# Patient Record
Sex: FEMALE | Race: WHITE | NOT HISPANIC OR LATINO | URBAN - METROPOLITAN AREA
[De-identification: names, ages, dates, MRNs, and addresses within clinical notes are randomized per-mention and may not be internally consistent; named-entity substitution may affect disease eponyms.]

---

## 2017-09-01 ENCOUNTER — INPATIENT (INPATIENT)
Facility: HOSPITAL | Age: 72
LOS: 0 days | Discharge: HOME | DRG: 379 | End: 2017-09-02
Attending: INTERNAL MEDICINE | Admitting: INTERNAL MEDICINE
Payer: COMMERCIAL

## 2017-09-01 VITALS
HEART RATE: 82 BPM | DIASTOLIC BLOOD PRESSURE: 84 MMHG | OXYGEN SATURATION: 98 % | TEMPERATURE: 97 F | WEIGHT: 160.06 LBS | HEIGHT: 64 IN | RESPIRATION RATE: 15 BRPM | SYSTOLIC BLOOD PRESSURE: 157 MMHG

## 2017-09-01 DIAGNOSIS — K92.1 MELENA: ICD-10-CM

## 2017-09-01 DIAGNOSIS — K92.2 GASTROINTESTINAL HEMORRHAGE, UNSPECIFIED: ICD-10-CM

## 2017-09-01 DIAGNOSIS — E78.00 PURE HYPERCHOLESTEROLEMIA, UNSPECIFIED: ICD-10-CM

## 2017-09-01 LAB
ALBUMIN SERPL ELPH-MCNC: 3.6 G/DL — SIGNIFICANT CHANGE UP (ref 3.3–5)
ALP SERPL-CCNC: 42 U/L — SIGNIFICANT CHANGE UP (ref 40–120)
ALT FLD-CCNC: 21 U/L — SIGNIFICANT CHANGE UP (ref 12–78)
ANION GAP SERPL CALC-SCNC: 11 MMOL/L — SIGNIFICANT CHANGE UP (ref 5–17)
APTT BLD: 30 SEC — SIGNIFICANT CHANGE UP (ref 27.5–37.4)
AST SERPL-CCNC: 17 U/L — SIGNIFICANT CHANGE UP (ref 15–37)
BILIRUB SERPL-MCNC: 0.3 MG/DL — SIGNIFICANT CHANGE UP (ref 0.2–1.2)
BLD GP AB SCN SERPL QL: SIGNIFICANT CHANGE UP
BUN SERPL-MCNC: 16 MG/DL — SIGNIFICANT CHANGE UP (ref 7–23)
CALCIUM SERPL-MCNC: 8.7 MG/DL — SIGNIFICANT CHANGE UP (ref 8.5–10.1)
CHLORIDE SERPL-SCNC: 108 MMOL/L — SIGNIFICANT CHANGE UP (ref 96–108)
CO2 SERPL-SCNC: 24 MMOL/L — SIGNIFICANT CHANGE UP (ref 22–31)
CREAT SERPL-MCNC: 0.9 MG/DL — SIGNIFICANT CHANGE UP (ref 0.5–1.3)
GLUCOSE SERPL-MCNC: 120 MG/DL — HIGH (ref 70–99)
HCT VFR BLD CALC: 25.3 % — LOW (ref 34.5–45)
HCT VFR BLD CALC: 26.3 % — LOW (ref 34.5–45)
HCT VFR BLD CALC: 34.8 % — SIGNIFICANT CHANGE UP (ref 34.5–45)
HGB BLD-MCNC: 11.6 G/DL — SIGNIFICANT CHANGE UP (ref 11.5–15.5)
HGB BLD-MCNC: 9 G/DL — LOW (ref 11.5–15.5)
HGB BLD-MCNC: 9.1 G/DL — LOW (ref 11.5–15.5)
INR BLD: 1.08 RATIO — SIGNIFICANT CHANGE UP (ref 0.88–1.16)
LYMPHOCYTES # BLD AUTO: 44 % — SIGNIFICANT CHANGE UP (ref 13–44)
MCHC RBC-ENTMCNC: 30.4 PG — SIGNIFICANT CHANGE UP (ref 27–34)
MCHC RBC-ENTMCNC: 31.2 PG — SIGNIFICANT CHANGE UP (ref 27–34)
MCHC RBC-ENTMCNC: 31.6 PG — SIGNIFICANT CHANGE UP (ref 27–34)
MCHC RBC-ENTMCNC: 33.4 GM/DL — SIGNIFICANT CHANGE UP (ref 32–36)
MCHC RBC-ENTMCNC: 34.6 GM/DL — SIGNIFICANT CHANGE UP (ref 32–36)
MCHC RBC-ENTMCNC: 35.4 GM/DL — SIGNIFICANT CHANGE UP (ref 32–36)
MCV RBC AUTO: 89.1 FL — SIGNIFICANT CHANGE UP (ref 80–100)
MCV RBC AUTO: 90 FL — SIGNIFICANT CHANGE UP (ref 80–100)
MCV RBC AUTO: 90.8 FL — SIGNIFICANT CHANGE UP (ref 80–100)
MONOCYTES NFR BLD AUTO: 8 % — SIGNIFICANT CHANGE UP (ref 1–9)
NEUTROPHILS NFR BLD AUTO: 47 % — SIGNIFICANT CHANGE UP (ref 43–77)
NEUTS BAND # BLD: 1 % — SIGNIFICANT CHANGE UP (ref 0–8)
OB PNL STL: POSITIVE
PLAT MORPH BLD: NORMAL — SIGNIFICANT CHANGE UP
PLATELET # BLD AUTO: 145 K/UL — LOW (ref 150–400)
PLATELET # BLD AUTO: 151 K/UL — SIGNIFICANT CHANGE UP (ref 150–400)
PLATELET # BLD AUTO: 210 K/UL — SIGNIFICANT CHANGE UP (ref 150–400)
POTASSIUM SERPL-MCNC: 3.9 MMOL/L — SIGNIFICANT CHANGE UP (ref 3.5–5.3)
POTASSIUM SERPL-SCNC: 3.9 MMOL/L — SIGNIFICANT CHANGE UP (ref 3.5–5.3)
PROT SERPL-MCNC: 7.5 G/DL — SIGNIFICANT CHANGE UP (ref 6–8.3)
PROTHROM AB SERPL-ACNC: 11.8 SEC — SIGNIFICANT CHANGE UP (ref 9.8–12.7)
RBC # BLD: 2.85 M/UL — LOW (ref 3.8–5.2)
RBC # BLD: 2.92 M/UL — LOW (ref 3.8–5.2)
RBC # BLD: 3.84 M/UL — SIGNIFICANT CHANGE UP (ref 3.8–5.2)
RBC # FLD: 12.1 % — SIGNIFICANT CHANGE UP (ref 10.3–14.5)
RBC # FLD: 12.2 % — SIGNIFICANT CHANGE UP (ref 10.3–14.5)
RBC # FLD: 12.3 % — SIGNIFICANT CHANGE UP (ref 10.3–14.5)
RBC BLD AUTO: SIGNIFICANT CHANGE UP
SODIUM SERPL-SCNC: 143 MMOL/L — SIGNIFICANT CHANGE UP (ref 135–145)
WBC # BLD: 11.8 K/UL — HIGH (ref 3.8–10.5)
WBC # BLD: 7.9 K/UL — SIGNIFICANT CHANGE UP (ref 3.8–10.5)
WBC # BLD: 9.3 K/UL — SIGNIFICANT CHANGE UP (ref 3.8–10.5)
WBC # FLD AUTO: 11.8 K/UL — HIGH (ref 3.8–10.5)
WBC # FLD AUTO: 7.9 K/UL — SIGNIFICANT CHANGE UP (ref 3.8–10.5)
WBC # FLD AUTO: 9.3 K/UL — SIGNIFICANT CHANGE UP (ref 3.8–10.5)

## 2017-09-01 PROCEDURE — 88312 SPECIAL STAINS GROUP 1: CPT | Mod: 26

## 2017-09-01 PROCEDURE — 93010 ELECTROCARDIOGRAM REPORT: CPT

## 2017-09-01 PROCEDURE — 99285 EMERGENCY DEPT VISIT HI MDM: CPT

## 2017-09-01 PROCEDURE — 88305 TISSUE EXAM BY PATHOLOGIST: CPT | Mod: 26

## 2017-09-01 PROCEDURE — 71010: CPT | Mod: 26

## 2017-09-01 RX ORDER — OMEGA-3 ACID ETHYL ESTERS 1 G
0 CAPSULE ORAL
Qty: 0 | Refills: 0 | COMMUNITY

## 2017-09-01 RX ORDER — PANTOPRAZOLE SODIUM 20 MG/1
8 TABLET, DELAYED RELEASE ORAL
Qty: 80 | Refills: 0 | Status: DISCONTINUED | OUTPATIENT
Start: 2017-09-01 | End: 2017-09-01

## 2017-09-01 RX ORDER — PANTOPRAZOLE SODIUM 20 MG/1
40 TABLET, DELAYED RELEASE ORAL ONCE
Qty: 0 | Refills: 0 | Status: COMPLETED | OUTPATIENT
Start: 2017-09-01 | End: 2017-09-01

## 2017-09-01 RX ORDER — SUCRALFATE 1 G
1 TABLET ORAL EVERY 6 HOURS
Qty: 0 | Refills: 0 | Status: DISCONTINUED | OUTPATIENT
Start: 2017-09-01 | End: 2017-09-02

## 2017-09-01 RX ORDER — SODIUM CHLORIDE 9 MG/ML
1000 INJECTION INTRAMUSCULAR; INTRAVENOUS; SUBCUTANEOUS
Qty: 0 | Refills: 0 | Status: DISCONTINUED | OUTPATIENT
Start: 2017-09-01 | End: 2017-09-01

## 2017-09-01 RX ORDER — ONDANSETRON 8 MG/1
4 TABLET, FILM COATED ORAL ONCE
Qty: 0 | Refills: 0 | Status: DISCONTINUED | OUTPATIENT
Start: 2017-09-01 | End: 2017-09-01

## 2017-09-01 RX ORDER — ROSUVASTATIN CALCIUM 5 MG/1
0 TABLET ORAL
Qty: 0 | Refills: 0 | COMMUNITY

## 2017-09-01 RX ORDER — PANTOPRAZOLE SODIUM 20 MG/1
8 TABLET, DELAYED RELEASE ORAL
Qty: 80 | Refills: 0 | Status: DISCONTINUED | OUTPATIENT
Start: 2017-09-01 | End: 2017-09-02

## 2017-09-01 RX ORDER — SODIUM CHLORIDE 9 MG/ML
1000 INJECTION INTRAMUSCULAR; INTRAVENOUS; SUBCUTANEOUS ONCE
Qty: 0 | Refills: 0 | Status: COMPLETED | OUTPATIENT
Start: 2017-09-01 | End: 2017-09-01

## 2017-09-01 RX ORDER — METOCLOPRAMIDE HCL 10 MG
10 TABLET ORAL ONCE
Qty: 0 | Refills: 0 | Status: COMPLETED | OUTPATIENT
Start: 2017-09-01 | End: 2017-09-01

## 2017-09-01 RX ORDER — SODIUM CHLORIDE 9 MG/ML
1000 INJECTION INTRAMUSCULAR; INTRAVENOUS; SUBCUTANEOUS
Qty: 0 | Refills: 0 | Status: DISCONTINUED | OUTPATIENT
Start: 2017-09-01 | End: 2017-09-02

## 2017-09-01 RX ADMIN — SODIUM CHLORIDE 100 MILLILITER(S): 9 INJECTION INTRAMUSCULAR; INTRAVENOUS; SUBCUTANEOUS at 18:13

## 2017-09-01 RX ADMIN — PANTOPRAZOLE SODIUM 10 MG/HR: 20 TABLET, DELAYED RELEASE ORAL at 12:31

## 2017-09-01 RX ADMIN — SODIUM CHLORIDE 80 MILLILITER(S): 9 INJECTION INTRAMUSCULAR; INTRAVENOUS; SUBCUTANEOUS at 13:15

## 2017-09-01 RX ADMIN — SODIUM CHLORIDE 1000 MILLILITER(S): 9 INJECTION INTRAMUSCULAR; INTRAVENOUS; SUBCUTANEOUS at 11:37

## 2017-09-01 RX ADMIN — Medication 10 MILLIGRAM(S): at 13:15

## 2017-09-01 RX ADMIN — PANTOPRAZOLE SODIUM 40 MILLIGRAM(S): 20 TABLET, DELAYED RELEASE ORAL at 11:37

## 2017-09-01 RX ADMIN — PANTOPRAZOLE SODIUM 10 MG/HR: 20 TABLET, DELAYED RELEASE ORAL at 13:35

## 2017-09-01 NOTE — ED ADULT NURSE NOTE - OBJECTIVE STATEMENT
received pt in bed #12b Pt A&O c/o black stools since Wednesday went to urgent care yesterday & was told there is blood in her stool. Pt seen by Dr Billings Pt resting comf denies abdominal pain Will monitor

## 2017-09-01 NOTE — ED PROVIDER NOTE - OBJECTIVE STATEMENT
pt c/o black stools x 3 days. pt seen yest at urgent care, had positive occult blood. no fevers, chills, ha, d/n/v, cp, sob, abd pain.  cheyanne - maryam naderson

## 2017-09-01 NOTE — CONSULT NOTE ADULT - SUBJECTIVE AND OBJECTIVE BOX
Chief Complaint:  Patient is a 71y old  Female who presents with a chief complaint of Melena     HPI: This is a 71 year old female with a past medical history significant for GERD, FIDENCIO, HLD who presents complaining of black tarry stools which started 2 days ago. Yesterday she went to an urgent care center and was told she had blood in her stool. Pt admits to nausea. The patient denies vomiting, BRBPR, abdominal pain, dysphasia. Pt last had a Colonoscopy in 2013 which was reportedly normal.    Allergies:  sulfa drugs (Nausea)      Medications:      PMHX/PSHX:  High cholesterol      Family history:  Non- contributory     Social History: admits to around 8 drinks/weekly, denies tobacco use    ROS:     General:  No wt loss, fevers, chills, night sweats, fatigue,   Eyes:  Good vision, no reported pain  ENT:  No sore throat, pain, runny nose, dysphagia  CV:  No pain, palpitations, hypo/hypertension  Resp:  No dyspnea, cough, tachypnea, wheezing  GI:  +melena   :  No pain, bleeding, incontinence, nocturia  Muscle:  No pain, weakness  Neuro:  No weakness, tingling, memory problems  Psych:  No fatigue, insomnia, mood problems, depression  Endocrine:  No polyuria, polydipsia, cold/heat intolerance  Heme:  No petechiae, ecchymosis, easy bruisability  Skin:  No rash, tattoos, scars, edema      PHYSICAL EXAM:   Vital Signs:  Vital Signs Last 24 Hrs  T(C): 36.3 (01 Sep 2017 10:57), Max: 36.3 (01 Sep 2017 10:57)  T(F): 97.4 (01 Sep 2017 10:57), Max: 97.4 (01 Sep 2017 10:57)  HR: 82 (01 Sep 2017 10:57) (82 - 82)  BP: 157/84 (01 Sep 2017 10:57) (157/84 - 157/84)  BP(mean): --  RR: 15 (01 Sep 2017 10:57) (15 - 15)  SpO2: 98% (01 Sep 2017 10:57) (98% - 98%)  Daily Height in cm: 162.56 (01 Sep 2017 10:57)    Daily     GENERAL:  Appears stated age, well-groomed, well-nourished, no distress  HEENT:  NC/AT,  conjunctivae clear and pink, no thyromegaly, nodules, adenopathy, no JVD, sclera -anicteric  CHEST:  Full & symmetric excursion, no increased effort, breath sounds clear  HEART:  Regular rhythm, S1, S2, no murmur/rub/S3/S4, no abdominal bruit, no edema  ABDOMEN:  Soft, non-tender, non-distended, normoactive bowel sounds,  no masses ,no hepato-splenomegaly, no signs of chronic liver disease  EXTREMITIES  no cyanosis,clubbing or edema  SKIN:  No rash/erythema/ecchymoses/petechiae/wounds/abscess/warm/dry  NEURO:  Alert, oriented, no asterixis, no tremor, no encephalopathy    LABS:                        11.6   11.8  )-----------( 210      ( 01 Sep 2017 11:31 )             34.8             PT/INR - ( 01 Sep 2017 11:31 )   PT: 11.8 sec;   INR: 1.08 ratio         PTT - ( 01 Sep 2017 11:31 )  PTT:30.0 sec        Imaging:

## 2017-09-01 NOTE — H&P ADULT - HISTORY OF PRESENT ILLNESS
This is a 71 F with PMH of HLD who came in c/o black stool. Patient reports feeling some reflux/acidity on 8/29/17 for which she took a zantac. On 8/30/17 she had one episode of black tarry stool. She had another episode on 8/31/17. She went to an urgent care center where she tested positive for OB. She reports feeling some lightheadedness today. Denies CP, SOB, EMERSON, n/v/d, palpitations, fevers, chills, abdominal pain. She had another dark BM this AM. She denies use of ibuprofen, motrin, aleve, or any other NSAIDs.

## 2017-09-01 NOTE — H&P ADULT - NEGATIVE GASTROINTESTINAL SYMPTOMS
no nausea/no vomiting/no flatulence/no change in bowel habits/no hematochezia/no steatorrhea/no constipation/no abdominal pain/no hiccoughs/no jaundice/no diarrhea

## 2017-09-01 NOTE — CONSULT NOTE ADULT - PROBLEM SELECTOR RECOMMENDATION 9
Pt with melena and FOBT+ stool  Likely upper gastrointestinal source of bleeding  keep pt NPO, Start IV Protonix gtt  Monitor H/H Q 4-6 hours, transfuse prn   Plan for upper endoscopy to further evaluate

## 2017-09-01 NOTE — H&P ADULT - PROBLEM SELECTOR PLAN 1
admit  NPO  IVF  IV PPI gtt  serial CBC  transfuse prn  MAY PROCEED FOR EGD TODAY  GI consult  further treatment pending clinical course

## 2017-09-02 VITALS
SYSTOLIC BLOOD PRESSURE: 130 MMHG | TEMPERATURE: 98 F | OXYGEN SATURATION: 100 % | DIASTOLIC BLOOD PRESSURE: 74 MMHG | HEART RATE: 74 BPM | RESPIRATION RATE: 17 BRPM

## 2017-09-02 DIAGNOSIS — K27.4 CHRONIC OR UNSPECIFIED PEPTIC ULCER, SITE UNSPECIFIED, WITH HEMORRHAGE: ICD-10-CM

## 2017-09-02 LAB
ANION GAP SERPL CALC-SCNC: 5 MMOL/L — SIGNIFICANT CHANGE UP (ref 5–17)
BUN SERPL-MCNC: 9 MG/DL — SIGNIFICANT CHANGE UP (ref 7–23)
CALCIUM SERPL-MCNC: 7.9 MG/DL — LOW (ref 8.5–10.1)
CHLORIDE SERPL-SCNC: 114 MMOL/L — HIGH (ref 96–108)
CO2 SERPL-SCNC: 27 MMOL/L — SIGNIFICANT CHANGE UP (ref 22–31)
CREAT SERPL-MCNC: 0.7 MG/DL — SIGNIFICANT CHANGE UP (ref 0.5–1.3)
GLUCOSE SERPL-MCNC: 92 MG/DL — SIGNIFICANT CHANGE UP (ref 70–99)
HCT VFR BLD CALC: 26.8 % — LOW (ref 34.5–45)
HGB BLD-MCNC: 9 G/DL — LOW (ref 11.5–15.5)
MAGNESIUM SERPL-MCNC: 2 MG/DL — SIGNIFICANT CHANGE UP (ref 1.6–2.6)
MCHC RBC-ENTMCNC: 30.4 PG — SIGNIFICANT CHANGE UP (ref 27–34)
MCHC RBC-ENTMCNC: 33.5 GM/DL — SIGNIFICANT CHANGE UP (ref 32–36)
MCV RBC AUTO: 90.8 FL — SIGNIFICANT CHANGE UP (ref 80–100)
PLATELET # BLD AUTO: 143 K/UL — LOW (ref 150–400)
POTASSIUM SERPL-MCNC: 3.8 MMOL/L — SIGNIFICANT CHANGE UP (ref 3.5–5.3)
POTASSIUM SERPL-SCNC: 3.8 MMOL/L — SIGNIFICANT CHANGE UP (ref 3.5–5.3)
RBC # BLD: 2.95 M/UL — LOW (ref 3.8–5.2)
RBC # FLD: 12.2 % — SIGNIFICANT CHANGE UP (ref 10.3–14.5)
SODIUM SERPL-SCNC: 146 MMOL/L — HIGH (ref 135–145)
WBC # BLD: 7.9 K/UL — SIGNIFICANT CHANGE UP (ref 3.8–10.5)
WBC # FLD AUTO: 7.9 K/UL — SIGNIFICANT CHANGE UP (ref 3.8–10.5)

## 2017-09-02 PROCEDURE — 96374 THER/PROPH/DIAG INJ IV PUSH: CPT

## 2017-09-02 PROCEDURE — 85610 PROTHROMBIN TIME: CPT

## 2017-09-02 PROCEDURE — 99285 EMERGENCY DEPT VISIT HI MDM: CPT | Mod: 25

## 2017-09-02 PROCEDURE — 88312 SPECIAL STAINS GROUP 1: CPT

## 2017-09-02 PROCEDURE — 86850 RBC ANTIBODY SCREEN: CPT

## 2017-09-02 PROCEDURE — 83735 ASSAY OF MAGNESIUM: CPT

## 2017-09-02 PROCEDURE — 86900 BLOOD TYPING SEROLOGIC ABO: CPT

## 2017-09-02 PROCEDURE — 96375 TX/PRO/DX INJ NEW DRUG ADDON: CPT

## 2017-09-02 PROCEDURE — 80053 COMPREHEN METABOLIC PANEL: CPT

## 2017-09-02 PROCEDURE — 80048 BASIC METABOLIC PNL TOTAL CA: CPT

## 2017-09-02 PROCEDURE — 71045 X-RAY EXAM CHEST 1 VIEW: CPT

## 2017-09-02 PROCEDURE — 85027 COMPLETE CBC AUTOMATED: CPT

## 2017-09-02 PROCEDURE — 96376 TX/PRO/DX INJ SAME DRUG ADON: CPT

## 2017-09-02 PROCEDURE — 86901 BLOOD TYPING SEROLOGIC RH(D): CPT

## 2017-09-02 PROCEDURE — 88305 TISSUE EXAM BY PATHOLOGIST: CPT

## 2017-09-02 PROCEDURE — 93005 ELECTROCARDIOGRAM TRACING: CPT

## 2017-09-02 PROCEDURE — 85730 THROMBOPLASTIN TIME PARTIAL: CPT

## 2017-09-02 PROCEDURE — 82272 OCCULT BLD FECES 1-3 TESTS: CPT

## 2017-09-02 RX ORDER — SUCRALFATE 1 G
1 TABLET ORAL
Qty: 90 | Refills: 0 | OUTPATIENT
Start: 2017-09-02 | End: 2017-10-02

## 2017-09-02 RX ORDER — ASPIRIN/CALCIUM CARB/MAGNESIUM 324 MG
0 TABLET ORAL
Qty: 0 | Refills: 0 | COMMUNITY

## 2017-09-02 RX ORDER — PANTOPRAZOLE SODIUM 20 MG/1
1 TABLET, DELAYED RELEASE ORAL
Qty: 60 | Refills: 1 | OUTPATIENT
Start: 2017-09-02 | End: 2017-10-31

## 2017-09-02 RX ADMIN — Medication 1 GRAM(S): at 15:32

## 2017-09-02 RX ADMIN — Medication 1 GRAM(S): at 09:29

## 2017-09-02 RX ADMIN — PANTOPRAZOLE SODIUM 10 MG/HR: 20 TABLET, DELAYED RELEASE ORAL at 05:28

## 2017-09-02 RX ADMIN — Medication 1 GRAM(S): at 03:53

## 2017-09-02 NOTE — PROGRESS NOTE ADULT - PROBLEM SELECTOR PLAN 1
h/h stable  continue ppi, carafate  advance diet  GI f/u  D/C home when ok with GI -- today or tomorrow
no further melena, s/p EGD w/ PUD and thin based ulcer.  No GI objection to d/c w/ protonix and carafate.

## 2017-09-02 NOTE — DISCHARGE NOTE ADULT - PATIENT PORTAL LINK FT
“You can access the [facility] Patient Portal, offered by [facility], by registering with the following website: [email or web address]”

## 2017-09-02 NOTE — DISCHARGE NOTE ADULT - PLAN OF CARE
. Continue current medications  No aspirin, advil, aleve, motrin, etc.  Tylenol ok for pain  Follow-up with primary doctor next week.  Follow-up with GI in 4-6 weeks.

## 2017-09-02 NOTE — DISCHARGE NOTE ADULT - CARE PROVIDER_API CALL
[place],   Phone: (   )    -  Fax: (   )    -    Josh Lewis (MD), Gastroenterology  [address] [place] [id number]  Phone: [phone]  Fax: [phone]

## 2017-09-02 NOTE — DISCHARGE NOTE ADULT - CARE PROVIDERS DIRECT ADDRESSES
,DirectAddress_Unknown,[email or web address]

## 2017-09-02 NOTE — DISCHARGE NOTE ADULT - HOSPITAL COURSE
This is a 71 F with PMH of HLD who came in c/o black stool. Patient reports feeling some reflux/acidity on 8/29/17 for which she took a zantac. On 8/30/17 she had one episode of black tarry stool. She had another episode on 8/31/17. She went to an urgent care center where she tested positive for OB. She reports feeling some lightheadedness today. Denies CP, SOB, EMERSON, n/v/d, palpitations, fevers, chills, abdominal pain. She had another dark BM this AM. She denies use of ibuprofen, motrin, aleve, or any other NSAIDs.    She was placed on iv ppi gtt. EGD showed multiple non-bleeding ulcers. h/h remained stable. Tolerating diet. Cleared by GI for d/c home with ppi bid and carafate.

## 2017-09-02 NOTE — DISCHARGE NOTE ADULT - MEDICATION SUMMARY - MEDICATIONS TO TAKE
I will START or STAY ON the medications listed below when I get home from the hospital:    Crestor  --  by mouth   -- Indication: For High cholesterol    sucralfate 1 g oral tablet  -- 1 tab(s) by mouth 3 times a day  -- Do not take dairy products, antacids, or iron preparations within one hour of this medication.  It is very important that you take or use this exactly as directed.  Do not skip doses or discontinue unless directed by your doctor.  Take medication on an empty stomach 1 hour before or 2 to 3 hours after a meal unless otherwise directed by your doctor.    -- Indication: For Peptic ulcer disease with hemorrhage    Lovaza 1000 mg oral capsule  --  by mouth   -- Indication: For High cholesterol    pantoprazole 40 mg oral delayed release tablet  -- 1 tab(s) by mouth 2 times a day  -- It is very important that you take or use this exactly as directed.  Do not skip doses or discontinue unless directed by your doctor.  Obtain medical advice before taking any non-prescription drugs as some may affect the action of this medication.  Swallow whole.  Do not crush.    -- Indication: For Peptic ulcer disease with hemorrhage

## 2017-09-02 NOTE — DISCHARGE NOTE ADULT - CARE PLAN
Principal Discharge DX:	Peptic ulcer disease with hemorrhage  Goal:	.  Instructions for follow-up, activity and diet:	Continue current medications  No aspirin, advil, aleve, motrin, etc.  Tylenol ok for pain  Follow-up with primary doctor next week.  Follow-up with GI in 4-6 weeks.

## 2017-09-02 NOTE — PROGRESS NOTE ADULT - SUBJECTIVE AND OBJECTIVE BOX
DEPARTMENT OF ANESTHESIA  POST ANESTHETIC EVALUATION    The Patient was interviewed and evaluated    Vital Signs Last 24 Hrs  T(C): 36.7 (02 Sep 2017 07:12), Max: 37 (01 Sep 2017 14:41)  T(F): 98 (02 Sep 2017 07:12), Max: 98.6 (01 Sep 2017 14:41)  HR: 74 (02 Sep 2017 07:12) (74 - 86)  BP: 130/74 (02 Sep 2017 07:12) (112/62 - 157/84)  BP(mean): --  RR: 17 (02 Sep 2017 07:12) (14 - 20)  SpO2: 100% (02 Sep 2017 07:12) (96% - 100%)    Evaluation:  s/p EGD    (x ) No apparent complications or complaints regarding anesthesia care at this time  (x ) All questions were answered    Condition:  (x ) Stable      ( ) Guarded      ( ) Critical    Recommendations:  (x ) None     ( ) Other:
INTERVAL HPI/OVERNIGHT EVENTS:  Pt seen at bedside, denies any complaints    MEDICATIONS  (STANDING):  pantoprazole Infusion 8 mG/Hr (10 mL/Hr) IV Continuous <Continuous>  sucralfate suspension 1 Gram(s) Oral every 6 hours    MEDICATIONS  (PRN):      Allergies    sulfa drugs (Nausea)    Intolerances        ROS:   General:  No wt loss, fevers, chills, night sweats, fatigue,   Eyes:  Good vision, no reported pain  ENT:  No sore throat, pain, runny nose, dysphagia  CV:  No pain, palpitations, hypo/hypertension  Resp:  No dyspnea, cough, tachypnea, wheezing  GI:  No pain, No nausea, No vomiting, No diarrhea, No constipation, No weight loss, No fever, No pruritis, No rectal bleeding, No tarry stools, No dysphagia,  :  No pain, bleeding, incontinence, nocturia  Muscle:  No pain, weakness  Neuro:  No weakness, tingling, memory problems  Psych:  No fatigue, insomnia, mood problems, depression  Endocrine:  No polyuria, polydipsia, cold/heat intolerance  Heme:  No petechiae, ecchymosis, easy bruisability  Skin:  No rash, tattoos, scars, edema      PHYSICAL EXAM:   Vital Signs:  Vital Signs Last 24 Hrs  T(C): 36.7 (02 Sep 2017 07:12), Max: 37 (01 Sep 2017 14:41)  T(F): 98 (02 Sep 2017 07:12), Max: 98.6 (01 Sep 2017 14:41)  HR: 74 (02 Sep 2017 07:12) (74 - 86)  BP: 130/74 (02 Sep 2017 07:12) (112/62 - 148/76)  BP(mean): --  RR: 17 (02 Sep 2017 07:12) (14 - 20)  SpO2: 100% (02 Sep 2017 07:12) (96% - 100%)  Daily     Daily     GENERAL:  Appears stated age, well-groomed, well-nourished, no distress  HEENT:  NC/AT,  conjunctivae clear and pink, no thyromegaly, nodules, adenopathy, no JVD, sclera -anicteric  CHEST:  Full & symmetric excursion, no increased effort, breath sounds clear  HEART:  Regular rhythm, S1, S2, no murmur/rub/S3/S4, no abdominal bruit, no edema  ABDOMEN:  Soft, non-tender, non-distended, normoactive bowel sounds,  no masses ,no hepato-splenomegaly, no signs of chronic liver disease  EXTEREMITIES:  no cyanosis,clubbing or edema  SKIN:  No rash/erythema/ecchymoses/petechiae/wounds/abscess/warm/dry  NEURO:  Alert, oriented, no asterixis, no tremor, no encephalopathy      LABS:                        9.0    7.9   )-----------( 143      ( 02 Sep 2017 06:35 )             26.8     09-02    146<H>  |  114<H>  |  9   ----------------------------<  92  3.8   |  27  |  0.70    Ca    7.9<L>      02 Sep 2017 06:35  Mg     2.0     09-02    TPro  7.5  /  Alb  3.6  /  TBili  0.3  /  DBili  x   /  AST  17  /  ALT  21  /  AlkPhos  42  09-01    PT/INR - ( 01 Sep 2017 11:31 )   PT: 11.8 sec;   INR: 1.08 ratio         PTT - ( 01 Sep 2017 11:31 )  PTT:30.0 sec      RADIOLOGY & ADDITIONAL TESTS:
Patient is a 71y old  Female who presents with a chief complaint of melena for 2days (01 Sep 2017 17:48)      INTERVAL HPI/OVERNIGHT EVENTS: Patient seen and examined. NAD. No complaints. Feels well.      Vital Signs Last 24 Hrs  T(C): 36.7 (02 Sep 2017 07:12), Max: 37 (01 Sep 2017 14:41)  T(F): 98 (02 Sep 2017 07:12), Max: 98.6 (01 Sep 2017 14:41)  HR: 74 (02 Sep 2017 07:12) (74 - 86)  BP: 130/74 (02 Sep 2017 07:12) (112/62 - 157/84)  BP(mean): --  RR: 17 (02 Sep 2017 07:12) (14 - 20)  SpO2: 100% (02 Sep 2017 07:12) (96% - 100%)I&O's Summary    01 Sep 2017 07:01  -  02 Sep 2017 07:00  --------------------------------------------------------  IN: 545 mL / OUT: 1450 mL / NET: -905 mL        LABS:                        9.0    7.9   )-----------( 143      ( 02 Sep 2017 06:35 )             26.8     09-02    146<H>  |  114<H>  |  9   ----------------------------<  92  3.8   |  27  |  0.70    Ca    7.9<L>      02 Sep 2017 06:35  Mg     2.0     09-02    TPro  7.5  /  Alb  3.6  /  TBili  0.3  /  DBili  x   /  AST  17  /  ALT  21  /  AlkPhos  42  09-01    PT/INR - ( 01 Sep 2017 11:31 )   PT: 11.8 sec;   INR: 1.08 ratio         PTT - ( 01 Sep 2017 11:31 )  PTT:30.0 sec    CAPILLARY BLOOD GLUCOSE              pantoprazole Infusion 8 mG/Hr IV Continuous <Continuous>  sucralfate suspension 1 Gram(s) Oral every 6 hours      REVIEW OF SYSTEMS:  CONSTITUTIONAL: No fever, weight loss, or fatigue  NECK: No pain or stiffness  RESPIRATORY: No cough, wheezing, chills or hemoptysis; No shortness of breath  CARDIOVASCULAR: No chest pain, palpitations, dizziness, or leg swelling  GASTROINTESTINAL: No abdominal or epigastric pain. No nausea, vomiting, or hematemesis; No diarrhea or constipation. No melena or hematochezia.  GENITOURINARY: No dysuria, frequency, hematuria, or incontinence  NEUROLOGICAL: No headaches, loss of strength, numbness, or tremors  SKIN: No itching, burning  MUSCULOSKELETAL: No joint pain or swelling; No muscle, back, or extremity pain  PSYCHIATRIC: No depression, mood swings, HEME/LYMPH: No easy bruising, or bleeding gums  ALLERY AND IMMUNOLOGIC: No hives       Consultant(s) Notes Reviewed:  [x ] YES  [ ] NO    PHYSICAL EXAM:  GENERAL: NAD, well-groomed, well-developed  HEAD:  Atraumatic, Normocephalic  EYES: EOMI, PERRLA, conjunctiva and sclera clear  ENMT: No tonsillar erythema, exudates, or enlargement; Moist mucous membranes  NECK: Supple, No JVD  NERVOUS SYSTEM:  Awake & alert  CHEST/LUNG: Clear to auscultation bilaterally; No rales, rhonchi, wheezing,  HEART: Regular rate and rhythm  ABDOMEN: Soft, Nontender, Nondistended; Bowel sounds present  EXTREMITIES:  No clubbing, cyanosis, or edema  LYMPH: No lymphadenopathy noted  SKIN: No rashes      Advanced care planning discussed with patient/family [x ] YES   [ ] NO

## 2017-09-05 DIAGNOSIS — G47.33 OBSTRUCTIVE SLEEP APNEA (ADULT) (PEDIATRIC): ICD-10-CM

## 2017-09-05 DIAGNOSIS — E78.5 HYPERLIPIDEMIA, UNSPECIFIED: ICD-10-CM

## 2017-09-05 DIAGNOSIS — K21.9 GASTRO-ESOPHAGEAL REFLUX DISEASE WITHOUT ESOPHAGITIS: ICD-10-CM

## 2017-09-05 DIAGNOSIS — Z88.2 ALLERGY STATUS TO SULFONAMIDES: ICD-10-CM

## 2017-09-05 DIAGNOSIS — Z79.82 LONG TERM (CURRENT) USE OF ASPIRIN: ICD-10-CM

## 2017-09-05 DIAGNOSIS — K27.4 CHRONIC OR UNSPECIFIED PEPTIC ULCER, SITE UNSPECIFIED, WITH HEMORRHAGE: ICD-10-CM

## 2017-09-05 DIAGNOSIS — Z28.21 IMMUNIZATION NOT CARRIED OUT BECAUSE OF PATIENT REFUSAL: ICD-10-CM

## 2017-09-06 DIAGNOSIS — K29.70 GASTRITIS, UNSPECIFIED, WITHOUT BLEEDING: ICD-10-CM

## 2022-04-24 NOTE — PATIENT PROFILE ADULT. - FUNCTIONAL SCREEN CURRENT LEVEL: TRANSFERRING, MLM
Joselin Natarajan  20 y.o. female    Chief Complaint   Patient presents with   • Epigastric Pain       Patient arrives with complaints of epigastric abdominal pain that began yesterday AM when pt awoke. Pain is burning and constant. Endorses belching. Denies radiation of pain. Endorses nausea, denies vomiting.     Tried ibuprofen for pain with no relief.      
(0) independent

## 2022-09-28 NOTE — ED ADULT NURSE NOTE - FALL HARM RISK TYPE OF ASSESSMENT
Rotator Cuff Injury     WHAT IS A ROTATOR CUFF INJURY?    A rotator cuff injury is irritation of or damage to the group of tendons and muscles that hold your shoulder joint together. Tendons are strong bands of tissue that attach muscle to bone. You use the muscles and tendons in your shoulder joint to move your shoulder and raise your arm over your head.        WHAT IS THE CAUSE?    A rotator cuff injury can be caused by:    Overuse of your shoulder in a sport or work activity that involves repetitive overhead movement of your shoulder, like swimming, baseball (mainly pitching), football, tennis, painting, plastering, or housework.  A sudden activity that twists your shoulder or tears your tendon, such as using your arm to break a fall, falling onto your arm, or lifting a heavy object  You may be at higher risk for a rotator cuff injury if you have poor head and shoulder posture, especially if you are older.    WHAT ARE THE SYMPTOMS?    Symptoms may include:    Pain and weakness in your arm and shoulder  Loss of shoulder movement, especially when you try to raise your arm overhead    HOW IS IT DIAGNOSED?    Your healthcare provider will ask about your symptoms, activities, and medical history and examine you. You may have X-rays or other scans or procedures, such as:    An ultrasound, which uses sound waves to show pictures of your shoulder joint  An MRI, which uses a strong magnetic field and radio waves to show detailed pictures of your shoulder joint  An arthrogram, which is an X-ray or MRI taken after a dye is injected into your joint to outline its shape  Arthroscopy, which is a type of surgery done with a small scope inserted into your joint so your provider can look directly at your joint    HOW IS IT TREATED?    You will need to change or stop doing the activities that cause pain until your injury has healed. For example, avoid strenuous activity or any overhead motion that causes pain. Also, try to make  sure that you are practicing good posture and are not slouching forward.    Your healthcare provider may recommend stretching and strengthening exercises to help you heal.    If you have a bad tear, you may need to have it repaired with surgery. After surgery, your treatment plan will include physical therapy to strengthen your shoulder as it heals.    The pain often gets better within a few weeks with self-care, but some injuries may take several months or longer to heal. It s important to follow all of your healthcare provider s instructions.    HOW CAN I TAKE CARE OF MYSELF?    To keep swelling down and help relieve pain:    Put an ice pack, gel pack, or package of frozen vegetables wrapped in a cloth on the area every 3 to 4 hours for up to 20 minutes at a time.  Take nonprescription pain medicine, such as acetaminophen, ibuprofen, or naproxen. Nonsteroidal anti-inflammatory medicines (NSAIDs), such as ibuprofen and naproxen, may cause stomach bleeding and other problems. These risks increase with age. Read the label and take as directed. Unless recommended by your healthcare provider, you should not take this medicine for more than 10 days.  Moist heat may help relieve pain, relax your muscles, and make it easier to move your arm and shoulder. Put moist heat on the injured area for 10 to 15 minutes before you do warm-up and stretching exercises. Moist heat includes heat patches or moist heating pads that you can buy at most drugstores, a warm wet washcloth, or a hot shower. To prevent burns to your skin, follow directions on the package and do not lie on any type of hot pad. Don t use heat if you have swelling.    Follow your healthcare provider's instructions, including any exercises recommended by your provider. Ask your provider:    How and when you will hear your test results  How long it will take to recover  What activities you should avoid, including how much you can lift, and when you can return to your  normal activities  How to take care of yourself at home  What symptoms or problems you should watch for and what to do if you have them  Make sure you know when you should come back for a checkup.    HOW CAN I HELP PREVENT A ROTATOR CUFF INJURY?    Warm-up exercises and stretching before activities can help prevent injuries. For example, do exercises that strengthen your shoulder muscles. If your arm or shoulder hurts after exercise, putting ice on it may help keep it from getting injured.    Follow safety rules and use any protective equipment recommended for your work or sport.    Avoid activities that cause pain. For example, avoid lifting heavy objects over your head.    Developed by [employer].  Published by [employer].  Copyright  2014 [employer] and/or one of its subsidiaries. All rights reserved.    Rotator Cuff Exercises    Isometric shoulder external rotation:  a doorway with your elbow bent 90 degrees and the back of the wrist on your injured side pressed against the door frame. Try to press your hand outward into the door frame. Hold for 5 seconds. Do 2 sets of 15.    Isometric shoulder internal rotation:  a doorway with your elbow bent 90 degrees and the front of the wrist on your injured side pressed against the door frame. Try to press your palm into the door frame. Hold for 5 seconds. Do 2 sets of 15.  Wand exercise, flexion: Stand upright and hold a stick in both hands, palms down. Stretch your arms by lifting them over your head, keeping your arms straight. Hold for 5 seconds and return to the starting position. Repeat 10 times.    Wand exercise, extension: Stand upright and hold a stick in both hands behind your back. Move the stick away from your back. Hold this position for 5 seconds. Relax and return to the starting position. Repeat 10 times.  Wand exercise, external rotation: Lie on your back and hold a stick in both hands, palms up. Your upper arms should be  resting on the floor with your elbows at your sides and bent 90 degrees. Use your uninjured arm to push your injured arm out away from your body. Keep the elbow of your injured arm at your side while it is being pushed. Hold the stretch for 5 seconds. Repeat 10 times.    Wand exercise, shoulder abduction and adduction: Stand and hold a stick with both hands, palms facing away from your body. Rest the stick against the front of your thighs. Use your uninjured arm to push your injured arm out to the side and up as high as possible. Keep your arms straight. Hold for 5 seconds. Repeat 10 times.    Resisted shoulder external rotation: Stand sideways next to a door with your injured arm farther from the door. Tie a knot in the end of the tubing and shut the knot in the door at waist level (or use cable weight machine at gym). Hold the other end of the tubing with the hand of your injured arm. Rest the hand of your injured arm across your stomach. Keeping your elbow in at your side, rotate your arm outward and away from your waist. Slowly return your arm to the starting position. Make sure you keep your elbow bent 90 degrees and your forearm parallel to the floor. Repeat 10 times. Build up to 2 sets of 15.    Resisted shoulder internal rotation: Stand sideways next to a door with your injured arm closest to the door. Tie a knot in the end of the tubing and shut the knot in the door at waist level (or use cable weight machine at gym). Hold the other end of the tubing with the hand of your injured arm. Bend the elbow of your injured arm 90 degrees. Keeping your elbow in at your side, rotate your forearm across your body and then slowly back to the starting position. Make sure you keep your forearm parallel to the floor. Do 2 sets of 8 to 12.    Scaption: Stand with your arms at your sides and with your elbows straight. Slowly raise your arms to eye level. As you raise your arms, spread them apart so that they are only  "slightly in front of your body (at about a 30-degree angle to the front of your body). Point your thumbs toward the ceiling. Hold for 2 seconds and lower your arms slowly. Do 2 sets of 15. Progress to holding a soup can or light weight when you are doing the exercise and increase the weight as the exercise gets easier.    Side-lying external rotation: Lie on your uninjured side with your injured arm at your side and your elbow bent 90 degrees. Keeping your elbow against your side, raise your forearm toward the ceiling and hold for 2 seconds. Slowly lower your arm. Do 2 sets of 15. You can start doing this exercise holding a soup can or light weight and gradually increase the weight as long as there is no pain.    Horizontal abduction: Lie on your stomach on a table or the edge of a bed with the arm on your injured side hanging down over the edge. Raise your arm out to the side, with your thumb pointed toward the ceiling, until your arm is parallel to the floor. Hold for 2 seconds and then lower it slowly. Start this exercise with no weight. As you get stronger, add a light weight or hold a soup can. Do 2 sets of 15.    Push-up with a plus: Begin on the floor on your hands and knees. Keep your hands a shoulder width apart and lift your feet off the floor. Arch your back as high as possible and round your shoulders (this is the \"plus\" part or the exercise). Bend your elbows and lower your body to the floor. Return to the starting position and arch your back again. Do 2 sets of 15.          " Daily Assessment

## 2023-06-05 NOTE — ED PROVIDER NOTE - CPE EDP HEAD NORM PED
Head atraumatic, normal cephalic shape. Opioid Counseling: I discussed with the patient the potential side effects of opioids including but not limited to addiction, altered mental status, and depression. I stressed avoiding alcohol, benzodiazepines, muscle relaxants and sleep aids unless specifically okayed by a physician. The patient verbalized understanding of the proper use and possible adverse effects of opioids. All of the patient's questions and concerns were addressed. They were instructed to flush the remaining pills down the toilet if they did not need them for pain.